# Patient Record
Sex: FEMALE | Race: AMERICAN INDIAN OR ALASKA NATIVE
[De-identification: names, ages, dates, MRNs, and addresses within clinical notes are randomized per-mention and may not be internally consistent; named-entity substitution may affect disease eponyms.]

---

## 2018-07-11 NOTE — EDM.PDOC
ED HPI GENERAL MEDICAL PROBLEM





- General


Chief Complaint: General


Stated Complaint: [id number] LUNG PAIN


Time Seen by Provider: 07/11/18 22:30


Source of Information: Reports: Patient


History Limitations: Reports: No Limitations





- History of Present Illness


INITIAL COMMENTS - FREE TEXT/NARRATIVE: 





c/o of right upper chest sharp pain with breathing in, noticed first this am 

while stretching and yawning. No recent cold or cough. No hx PE. No shortness 

of breath , no fever, no injury. Pain does not radiate. Has not tried anything 

to alleviate discomfort


  ** Right Upper Chest


Pain Score (Numeric/FACES): 5





- Related Data


 Allergies











Allergy/AdvReac Type Severity Reaction Status Date / Time


 


amoxicillin Allergy  Rash Verified 07/11/18 22:33











Home Meds: 


 Home Meds





. [No Known Home Meds]  07/11/18 [History]











Past Medical History


HEENT History: Reports: Impaired Vision


Other HEENT History: wears glasses


Cardiovascular History: Reports: None


Respiratory History: Reports: None


Gastrointestinal History: Reports: None


Genitourinary History: Reports: None


OB/GYN History: Reports: None


Musculoskeletal History: Reports: None


Neurological History: Reports: None


Psychiatric History: Reports: None


Endocrine/Metabolic History: Reports: None


Hematologic History: Reports: None


Immunologic History: Reports: None


Oncologic (Cancer) History: Reports: None


Dermatologic History: Reports: None





- Infectious Disease History


Infectious Disease History: Reports: Chicken Pox





- Past Surgical History


Head Surgeries/Procedures: Reports: None





Social & Family History





- Tobacco Use


Smoking Status *Q: Never Smoker


Second Hand Smoke Exposure: Yes





- Caffeine Use


Caffeine Use: Reports: Coffee, Soda





- Recreational Drug Use


Recreational Drug Use: No





ED ROS GENERAL





- Review of Systems


Review Of Systems: ROS reveals no pertinent complaints other than HPI.





ED EXAM, GENERAL





- Physical Exam


Exam: See Below


Exam Limited By: No Limitations


General Appearance: Alert, No Apparent Distress


Eye Exam: Bilateral Eye: EOMI


Ears: Normal External Exam


Nose: Normal Inspection


Throat/Mouth: Normal Inspection


Head: Atraumatic, Normocephalic


Neck: Normal Inspection, Full Range of Motion


Respiratory/Chest: No Respiratory Distress, Lungs Clear, Normal Breath Sounds


Cardiovascular: Normal Peripheral Pulses, Regular Rate, Rhythm


GI/Abdominal: Normal Bowel Sounds, Soft


Back Exam: Normal Inspection


Extremities: Normal Inspection, Normal Range of Motion


Neurological: Alert, Oriented, Normal Cognition


Psychiatric: Normal Affect


Skin Exam: Warm, Dry, Intact, Normal Color





Course





- Vital Signs


Last Recorded V/S: 


 Last Vital Signs











Temp  97.4 F   07/11/18 22:22


 


Pulse  61   07/11/18 22:22


 


Resp  18   07/11/18 22:22


 


BP  139/78   07/11/18 22:22


 


Pulse Ox  99   07/11/18 22:22














- Orders/Labs/Meds


Orders: 


 Active Orders 24 hr











 Category Date Time Status


 


 CXR [Chest 2V] [CR] Urgent Exams  07/11/18 22:38 Taken











Labs: 


 Laboratory Tests











  07/11/18 07/11/18 07/11/18 Range/Units





  22:48 22:48 22:48 


 


WBC  9.3    (5.0-10.0)  10^3/uL


 


RBC  4.46    (4.2-5.4)  10^6/uL


 


Hgb  11.4 L    (12.0-16.0)  g/dL


 


Hct  36.1 L    (37.0-47.0)  %


 


MCV  80.9    ()  fL


 


MCH  25.6 L    (27.0-34.0)  pg


 


MCHC  31.6 L    (33.0-35.0)  g/dL


 


Plt Count  340    (150-450)  10^3/uL


 


Neut % (Auto)  54.8    (42.2-75.2)  %


 


Lymph % (Auto)  32.8    (20.5-50.1)  %


 


Mono % (Auto)  6.3    (2-8)  %


 


Eos % (Auto)  5.8 H    (1.0-3.0)  %


 


Baso % (Auto)  0.3    (0.0-1.0)  %


 


D-Dimer, Quantitative   285   (0-400)  ng/mL


 


Sodium    138  (135-145)  mmol/L


 


Potassium    3.8  (3.6-5.0)  mmol/L


 


Chloride    107  (101-111)  mmol/L


 


Carbon Dioxide    26.0  (21.0-31.0)  mmol/L


 


Anion Gap    8.8  


 


BUN    14  (7-18)  mg/dL


 


Creatinine    0.7  (0.6-1.3)  mg/dL


 


Est Cr Clr Drug Dosing    121.78  mL/min


 


Estimated GFR (MDRD)    > 60  


 


BUN/Creatinine Ratio    20.00  


 


Glucose    131 H  ()  mg/dL


 


Calcium    9.0  (8.4-10.2)  mg/dl


 


Total Bilirubin    0.3  (0.2-1.0)  mg/dL


 


AST    27  (10-42)  IU/L


 


ALT    23  (10-60)  IU/L


 


Alkaline Phosphatase    68  ()  IU/L


 


Total Protein    8.6 H  (6.7-8.2)  g/dl


 


Albumin    4.0  (3.2-5.5)  g/dl


 


Globulin    4.6  


 


Albumin/Globulin Ratio    0.87  














- Radiology Interpretation


Free Text/Narrative:: 





CXR: Poor inspiratory effort, bibasilar subsegmental atelectasis





Departure





- Departure


Time of Disposition: 23:39


Disposition: Home, Self-Care 01


Condition: Good


Clinical Impression: 


 Right-sided chest wall pain








- Discharge Information


Instructions:  Nonspecific Chest Pain, Easy-to-Read


Referrals: 


PCP,None [Primary Care Provider] - 


Forms:  ED Department Discharge


Additional Instructions: 


ibuprofen 600mg one every 6 hours as needed


deep breathing every hour


follow up if symptoms worsen





- My Orders


Last 24 Hours: 


My Active Orders





07/11/18 22:38


CXR [Chest 2V] [CR] Urgent 














- Assessment/Plan


Last 24 Hours: 


My Active Orders





07/11/18 22:38


CXR [Chest 2V] [CR] Urgent

## 2020-01-06 NOTE — EDM.PDOC
ED HPI GENERAL MEDICAL PROBLEM





- General


Chief Complaint: Chest Pain


Stated Complaint: CHEST PAIN





- History of Present Illness


Treatments PTA: Reports: Other (see below)


Other Treatments PTA: none


  ** Left Anterior Chest


Pain Score (Numeric/FACES): 0





- Related Data


 Allergies











Allergy/AdvReac Type Severity Reaction Status Date / Time


 


amoxicillin Allergy  Rash Verified 01/05/20 23:49











Home Meds: 


 Home Meds





. [No Known Home Meds]  01/05/20 [History]











Past Medical History


HEENT History: Reports: Impaired Vision


Other HEENT History: wears glasses


Cardiovascular History: Reports: None


Respiratory History: Reports: None


Gastrointestinal History: Reports: None


Genitourinary History: Reports: None


OB/GYN History: Reports: None


Musculoskeletal History: Reports: Other (See Below)


Other Musculoskeletal History: hx rt arm fx and rt foot fx


Neurological History: Reports: None


Psychiatric History: Reports: None


Endocrine/Metabolic History: Reports: None


Hematologic History: Reports: None


Immunologic History: Reports: None


Oncologic (Cancer) History: Reports: None


Dermatologic History: Reports: None





- Infectious Disease History


Infectious Disease History: Reports: Chicken Pox





- Past Surgical History


Head Surgeries/Procedures: Reports: None





Social & Family History





- Family History


Family Medical History: Noncontributory





- Caffeine Use


Caffeine Use: Reports: Coffee





Course





- Vital Signs


Last Recorded V/S: 


 Last Vital Signs











Temp  98.8 F   01/05/20 23:44


 


Pulse  95   01/05/20 23:44


 


Resp  18   01/05/20 23:44


 


BP  133/83   01/05/20 23:44


 


Pulse Ox  98   01/05/20 23:44














Departure





- Departure


Disposition: Left Without Being Seen 07


Forms:  ED Department Discharge





Sepsis Event Note





- Evaluation


Sepsis Screening Result: No Definite Risk





- Focused Exam


Vital Signs: 


 Vital Signs











  Temp Pulse Resp BP Pulse Ox


 


 01/05/20 23:44  98.8 F  95  18  133/83  98











Date Exam was Performed: 01/06/20


Time Exam was Performed: 06:02

## 2021-09-19 NOTE — EDM.PDOC
ED HPI GENERAL MEDICAL PROBLEM





- General


Chief Complaint: Upper Extremity Injury/Pain


Stated Complaint: INJURED HAND


Time Seen by Provider: 09/19/21 22:40


Source of Information: Reports: Patient





- History of Present Illness


INITIAL COMMENTS - FREE TEXT/NARRATIVE: 


Pt is here for a right hand injury. She got upset at someone and punched a wall 

around 0800 this morning. She still has feeling in her hand and fingers, but has

been unable to bend her fingers since the incident. She denies any previous 

injury to the hand, but did break her wrist a long time ago. She denies any 

other injury. 





  ** Right Hand


Pain Score (Numeric/FACES): 7





- Related Data


                                    Allergies











Allergy/AdvReac Type Severity Reaction Status Date / Time


 


amoxicillin Allergy  Rash Verified 09/19/21 22:46











Home Meds: 


                                    Home Meds





. [No Known Home Meds]  01/05/20 [History]











Past Medical History


HEENT History: Reports: Impaired Vision


Other HEENT History: wears glasses


Cardiovascular History: Reports: None


Respiratory History: Reports: None


Gastrointestinal History: Reports: Cholelithiasis


Genitourinary History: Reports: None


OB/GYN History: Reports: None


Musculoskeletal History: Reports: Other (See Below)


Other Musculoskeletal History: hx rt arm fx and rt foot fx


Neurological History: Reports: None


Psychiatric History: Reports: None


Endocrine/Metabolic History: Reports: None


Hematologic History: Reports: Anemia


Immunologic History: Reports: None


Oncologic (Cancer) History: Reports: None


Dermatologic History: Reports: None





- Infectious Disease History


Infectious Disease History: Reports: Chicken Pox





- Past Surgical History


Head Surgeries/Procedures: Reports: None


GI Surgical History: Reports: Cholecystectomy





Social & Family History





- Family History


Family Medical History: No Pertinent Family History





- Caffeine Use


Caffeine Use: Reports: Coffee, Soda





Review of Systems





- Review of Systems


Review Of Systems: Comprehensive ROS is negative, except as noted in HPI.





ED EXAM, GENERAL





- Physical Exam


Exam: See Below


Exam Limited By: No Limitations


General Appearance: Alert, WD/WN, No Apparent Distress


Eye Exam: Bilateral Eye: Normal Inspection


Ears: Normal External Exam


Nose: Normal Inspection


Throat/Mouth: Normal Voice, No Airway Compromise


Head: Atraumatic, Normocephalic


Neck: Supple, Non-Tender


Respiratory/Chest: No Respiratory Distress, Lungs Clear, Normal Breath Sounds, 

No Accessory Muscle Use


Cardiovascular: Normal Peripheral Pulses, Regular Rate, Rhythm, No Murmur


GI/Abdominal: Soft, Non-Tender


 (Female) Exam: Deferred


Rectal (Female) Exam: Deferred


Back Exam: Normal Inspection, Full Range of Motion


Extremities: Normal Capillary Refill, Other (right hand swelling on ulnar side 

with deformity noted. Neurovascularly intact in distal hand and fingers. )


Neurological: Alert, Oriented, Normal Cognition, No Motor/Sensory Deficits


Psychiatric: Normal Affect, Normal Mood


Skin Exam: Warm, Dry, Intact, Normal Color





ED TRAUMA EXTREMITY PROCEDURES





- Splinting


  ** Right Upper Extremity


Splint Site: right hand/forearm


Pre-Procedure NV Status: Normal


Post-Procedure NV Status: Normal


Splint Material: Fiberglass


Splint Design: Gutter (ulnar)


Applied & Form Fitted By: Provider


Provider Post-Splint Application NV Check: NV Status Normal, Good Position


Complications: No





Course





- Vital Signs


Last Recorded V/S: 


                                Last Vital Signs











Temp  97.9 F   09/19/21 22:35


 


Pulse  100   09/19/21 22:35


 


Resp  16   09/19/21 22:35


 


BP  139/98 H  09/19/21 22:35


 


Pulse Ox  96   09/19/21 22:35














- Re-Assessments/Exams


Free Text/Narrative Re-Assessment/Exam: 


Boxers fracture of right 5th metacarpal reviewed with pt. Ulnar splint made. 


09/19/21 23:03











Departure





- Departure


Time of Disposition: 23:17


Disposition: Home, Self-Care 01


Condition: Good


Clinical Impression: 


Fracture of metacarpal bone


Qualifiers:


 Encounter type: initial encounter Metacarpal bone: fifth Fracture type: closed 

Metacarpal location: shaft Fracture alignment: displaced Laterality: right 

Qualified Code(s): S62.326A - Displaced fracture of shaft of fifth metacarpal 

bone, right hand, initial encounter for closed fracture








- Discharge Information


*PRESCRIPTION DRUG MONITORING PROGRAM REVIEWED*: Not Applicable


*COPY OF PRESCRIPTION DRUG MONITORING REPORT IN PATIENT BERNIE: Not Applicable


Instructions:  Cast or Splint Care, Adult, Easy-to-Read, Metacarpal Fracture, 

Easy-to-Read


Forms:  ED Department Discharge


Additional Instructions: 


Call your primary care provider tomorrow and schedule a follow up visit. 





Sepsis Event Note (ED)





- Focused Exam


Vital Signs: 


                                   Vital Signs











  Temp Pulse Resp BP Pulse Ox


 


 09/19/21 22:35  97.9 F  100  16  139/98 H  96

## 2021-09-19 NOTE — CR
PROCEDURE INFORMATION: 

Exam: XR Right Hand 

Exam date and time: 9/19/2021 10:54 PM 

Age: 30 years old 

Clinical indication: Other: Pain; Additional info: Hand injury, punched a wall 



TECHNIQUE: 

Imaging protocol: XR Right hand. 

Views: 3 or more views. 



COMPARISON: 

No relevant prior studies available. 



FINDINGS: 

Bones/joints: There is a transverse fracture of the distal shaft of the 5th 

metacarpal . Mild apex dorsal angulation. No additional fracture seen. Ulnar 

minus deformity. 

Soft tissues: Soft tissue swelling on the dorsum. 



IMPRESSION: 

Distal 5th metacarpal fracture.

## 2021-09-24 NOTE — CR
PROCEDURE INFORMATION: 

Exam: XR Right Tibia and Fibula 

Exam date and time: 9/24/2021 6:36 PM 

Age: 30 years old 

Clinical indication: Other: Marker points to area of laceration; Additional 

info: Chain saw to lower leg 



TECHNIQUE: 

Imaging protocol: XR Right tibia and fibula. 

Views: 2 views. 



COMPARISON: 

No relevant prior studies available. 



FINDINGS: 

Bones/joints: No fracture. No malalignment. 

Soft tissues: Anterior soft tissue trauma noted. No radiopaque foreign bodies. 



IMPRESSION: 

No acute osseous abnormality

## 2021-09-24 NOTE — EDM.PDOC
<Yancy Klein - Last Filed: 09/24/21 19:05>





ED HPI GENERAL MEDICAL PROBLEM





- General


Chief Complaint: Lower Extremity Injury/Pain


Stated Complaint: AMBULANCE


Time Seen by Provider: 09/24/21 18:30


Source of Information: Reports: Patient


History Limitations: Reports: No Limitations





- History of Present Illness


INITIAL COMMENTS - FREE TEXT/NARRATIVE: 


Patient is a 30-year-old female who presents to ER with [facility] 

[facility] with complaint of laceration to the right lower leg.  Patient states she

was cutting wood for a ceremony with a chain saw when the log slipped causing 

the chain saw to press against her leg.  Patient states she is not up-to-date on

her tetanus vaccination.  Denies any other injuries.  EMS did report patient 

glucose was in the 350 range and patient has no history of diabetes.


Onset: Today, Sudden





- Related Data


                                    Allergies











Allergy/AdvReac Type Severity Reaction Status Date / Time


 


amoxicillin Allergy  Rash Verified 09/24/21 18:28











Home Meds: 


                                    Home Meds





Acetaminophen [Pain Relief] 1,000 mg PO Q6HR PRN 09/24/21 [History]


Ibuprofen 600 mg PO Q6HR PRN 09/24/21 [History]











Past Medical History


HEENT History: Reports: Impaired Vision


Other HEENT History: wears glasses


Cardiovascular History: Reports: None


Respiratory History: Reports: None


Gastrointestinal History: Reports: Cholelithiasis


Genitourinary History: Reports: None


OB/GYN History: Reports: None


Musculoskeletal History: Reports: Other (See Below)


Other Musculoskeletal History: hx rt arm fx and rt foot fx


Neurological History: Reports: None


Psychiatric History: Reports: None


Endocrine/Metabolic History: Reports: None


Hematologic History: Reports: Anemia


Immunologic History: Reports: None


Oncologic (Cancer) History: Reports: None


Dermatologic History: Reports: None





- Infectious Disease History


Infectious Disease History: Reports: Chicken Pox





- Past Surgical History


Head Surgeries/Procedures: Reports: None


HEENT Surgical History: Reports: Oral Surgery


GI Surgical History: Reports: Cholecystectomy





Social & Family History





- Family History


Family Medical History: No Pertinent Family History





- Tobacco Use


Tobacco Use Status *Q: Never Tobacco User





- Caffeine Use


Caffeine Use: Reports: Coffee, Energy Drinks





- Recreational Drug Use


Recreational Drug Use: No





Review of Systems





- Review of Systems


Review Of Systems: Comprehensive ROS is negative, except as noted in HPI.





ED EXAM, GENERAL





- Physical Exam


Exam: See Below


Exam Limited By: No Limitations


General Appearance: Alert, WD/WN, Mild Distress


Eye Exam: Bilateral Eye: EOMI, Normal Inspection


Ears: Normal External Exam, Hearing Grossly Normal


Nose: Normal Inspection


Throat/Mouth: Normal Inspection, Normal Voice, No Airway Compromise


Head: Atraumatic, Normocephalic


Neck: Normal Inspection, Supple, Non-Tender, Full Range of Motion


Respiratory/Chest: No Respiratory Distress, Lungs Clear, Normal Breath Sounds, 

No Accessory Muscle Use, Chest Non-Tender


Cardiovascular: Normal Peripheral Pulses, Regular Rate, Rhythm, No Edema, No 

Gallop, No JVD, No Murmur, No Rub


Peripheral Pulses: 2+: Radial (L), Radial (R)


GI/Abdominal: Normal Bowel Sounds, Soft, Non-Tender


 (Female) Exam: Deferred


Rectal (Female) Exam: Deferred


Back Exam: Normal Inspection, Full Range of Motion, NT


Extremities: Normal Inspection, Normal Range of Motion, Non-Tender, Normal 

Capillary Refill, No Pedal Edema


Neurological: Alert, Oriented, CN II-XII Intact, Normal Cognition, Normal Gait, 

Normal Reflexes, No Motor/Sensory Deficits


Psychiatric: Normal Affect, Normal Mood


Skin Exam: Warm, Dry, Other (5cm laceration to anterior right shin)


Lymphatic: No Adenopathy





Course





- Re-Assessments/Exams


Free Text/Narrative Re-Assessment/Exam: 





09/24/21 19:05


Care turned over to SOLA Peoples at shift change. 





Departure





- Departure


Disposition: Home, Self-Care 01


Clinical Impression: 


 Contact with chainsaw as cause of accidental injury, Hyperglycemia





Laceration of right lower extremity


Qualifiers:


 Encounter type: initial encounter Qualified Code(s): S81.811A - Laceration 

without foreign body, right lower leg, initial encounter








- Discharge Information


Instructions:  Laceration Care, Adult


Forms:  ED Department Discharge


Additional Instructions: 


1.) Follow up with any primary care facility for suture removal in seven days. 


2.) You may take ibuprofen (Motrin/Advil) 400-800mg every six hours, as pain and

 swelling persist.  You may also take acetaminophen (Tylenol) 650-1000mg every 

six hours, as pain persists. You may stagger these medications so you are taking

 a dose every three hours. 


3.) Keep the wound clean and dry; You may cover it with a large bandage as 

drainage continues. 


4.) Follow up with your primary care provider, or return to the emergency 

department, with any increase in pain, redness, swelling, or grey/white 

drainage. 


5.) Follow up with your primary care provider in 7 days for repeat lab work, 

including glucose recheck. 


 





<Kimberli Wheeler - Last Filed: 09/25/21 19:36>





ED TRAUMA EXTREMITY PROCEDURES





- Laceration/Wound Repair


  ** Right Middle Anterior Midline Distal Leg


Lac/Wound Length In cm: 3.5


Appearance: Irregular, Mildly Contaminated


Distal NVT: Neuro & Vascular Intact, No Tendon Injury


Anesthetic Type: Local


Local Anesthesia - Lidocaine (Xylocaine): 1% Plain


Local Anesthetic Volume: Other (10)


Skin Prep: Chlorhexidine (Hibiciens), Saline, Sterile Drape


Saline Irrigation (cc's): 80


Exploration/Debridement/Repair: Wound Explored, In a Bloodless Field, Explored 

to Base, Moderate Debridement, Foreign Material Removed, Wound Margins Revised, 

Multiple Flaps Aligned


Closed With: Sutures


Suture Size: 4-0


# of Sutures: 8


Suture Type: Prolene


Drain Placement: No


Sterile Dressing Applied: Nurse


Tetanus Status Addressed: Yes


Complications: No





Course





- Vital Signs


Last Recorded V/S: 


                                Last Vital Signs











Temp  98.8 F   09/24/21 18:30


 


Pulse  86   09/24/21 18:30


 


Resp  18   09/24/21 18:30


 


BP  137/66   09/24/21 18:30


 


Pulse Ox  97   09/24/21 18:30














- Orders/Labs/Meds


Labs: 


                                Laboratory Tests











  09/24/21 09/24/21 Range/Units





  18:44 18:44 


 


WBC  8.9   (5.0-10.0)  10^3/uL


 


RBC  4.36   (4.2-5.4)  10^6/uL


 


Hgb  11.2 L   (12.0-16.0)  g/dL


 


Hct  34.6 L   (37.0-47.0)  %


 


MCV  79.4 L   ()  fL


 


MCH  25.7 L   (27.0-34.0)  pg


 


MCHC  32.4 L   (33.0-35.0)  g/dL


 


Plt Count  291  D   (150-450)  10^3/uL


 


Neut % (Auto)  59.2   (42.2-75.2)  %


 


Lymph % (Auto)  30.5   (20.5-50.1)  %


 


Mono % (Auto)  6.0   (2-8)  %


 


Eos % (Auto)  4.0 H   (1.0-3.0)  %


 


Baso % (Auto)  0.3   (0.0-1.0)  %


 


Sodium   131 L  (136-145)  mmol/L


 


Potassium   3.9  (3.5-5.1)  mmol/L


 


Chloride   98  ()  mmol/L


 


Carbon Dioxide   25  (21-32)  mmol/L


 


Anion Gap   11.9  (7-13)  mEq/L


 


BUN   14  (7-18)  mg/dL


 


Creatinine   0.97  (0.55-1.02)  mg/dL


 


Est Cr Clr Drug Dosing   84.01  mL/min


 


Estimated GFR (MDRD)   > 60  


 


BUN/Creatinine Ratio   14.4  (No establ ref range)  


 


Glucose   392 H  (70-99)  mg/dL


 


Calcium   8.6  (8.5-10.1)  mg/dL


 


Total Bilirubin   0.4  (0.2-1.0)  mg/dL


 


AST   20  (15-37)  U/L


 


ALT   31  (14-59)  U/L


 


Alkaline Phosphatase   84  ()  U/L


 


Total Protein   7.7  (6.4-8.2)  g/dL


 


Albumin   3.2 L  (3.4-5.0)  g/dL


 


Globulin   4.5  


 


Albumin/Globulin Ratio   0.71  











Meds: 


Medications














Discontinued Medications














Generic Name Dose Route Start Last Admin





  Trade Name Freq  PRN Reason Stop Dose Admin


 


Bacitracin  1 dose  09/24/21 18:46  09/24/21 18:54





  Bacitracin Oint 1 Gm U/D Packet  TOP  09/24/21 18:47  1 dose





  ONETIME ONE   Administration


 


Diphtheria/Tetanus/Acell Pertussis  0.5 ml  09/24/21 18:25  09/24/21 18:31





  Diphtheria,Pertussis(Acell),Tetanus Vaccine 0.5 Ml Syringe  IM  09/24/21 18:26

  0.5 ml





  .ONCE ONE   Administration


 


Lidocaine HCl  30 ml  09/24/21 18:46  09/24/21 18:54





  Lidocaine 1% 30 Ml Sdv  INJECT  09/24/21 18:47  30 ml





  ONETIME ONE   Administration














- Re-Assessments/Exams


Free Text/Narrative Re-Assessment/Exam: 





09/24/21


Care of patient assumed by writer from SOLA Oconnell at 1900. 





Moderate debridement of wound performed prior to laceration repair.  Laceration 

sutured without complication.  Supportive cares, as well as red flag signs and 

symptoms which would warrant reevaluation discussed.  Patient instructed on 

follow up for suture removal in 7 days.  Patient verbalized understanding and 

agreement with the plan of care. 








Departure





- Departure


Time of Disposition: 19:55


Condition: Good





- Discharge Information


*PRESCRIPTION DRUG MONITORING PROGRAM REVIEWED*: Not Applicable


*COPY OF PRESCRIPTION DRUG MONITORING REPORT IN PATIENT BERNIE: Not Applicable

## 2023-01-30 ENCOUNTER — HOSPITAL ENCOUNTER (EMERGENCY)
Dept: HOSPITAL 43 - DL.ED | Age: 33
LOS: 1 days | Discharge: HOME | End: 2023-01-31
Payer: MEDICAID

## 2023-01-30 DIAGNOSIS — M79.601: Primary | ICD-10-CM

## 2023-01-30 DIAGNOSIS — M79.602: ICD-10-CM

## 2023-01-30 DIAGNOSIS — M79.673: ICD-10-CM

## 2023-01-30 DIAGNOSIS — R73.9: ICD-10-CM

## 2023-01-30 DIAGNOSIS — R07.0: ICD-10-CM

## 2023-01-30 DIAGNOSIS — Z20.822: ICD-10-CM

## 2023-01-30 DIAGNOSIS — Z88.0: ICD-10-CM

## 2023-01-30 PROCEDURE — 84550 ASSAY OF BLOOD/URIC ACID: CPT

## 2023-01-30 PROCEDURE — 96360 HYDRATION IV INFUSION INIT: CPT

## 2023-01-30 PROCEDURE — 80305 DRUG TEST PRSMV DIR OPT OBS: CPT

## 2023-01-30 PROCEDURE — 81025 URINE PREGNANCY TEST: CPT

## 2023-01-30 PROCEDURE — 83605 ASSAY OF LACTIC ACID: CPT

## 2023-01-30 PROCEDURE — 81003 URINALYSIS AUTO W/O SCOPE: CPT

## 2023-01-30 PROCEDURE — 99283 EMERGENCY DEPT VISIT LOW MDM: CPT

## 2023-01-30 PROCEDURE — 36415 COLL VENOUS BLD VENIPUNCTURE: CPT

## 2023-01-30 PROCEDURE — 87040 BLOOD CULTURE FOR BACTERIA: CPT

## 2023-01-30 PROCEDURE — 85025 COMPLETE CBC W/AUTO DIFF WBC: CPT

## 2023-01-30 PROCEDURE — 0240U: CPT

## 2023-01-30 PROCEDURE — 80053 COMPREHEN METABOLIC PANEL: CPT

## 2023-01-31 VITALS — SYSTOLIC BLOOD PRESSURE: 139 MMHG | DIASTOLIC BLOOD PRESSURE: 87 MMHG | HEART RATE: 97 BPM

## 2023-01-31 LAB
AMPHET UR QL SCN: NEGATIVE
AMPHET UR QL SCN: NEGATIVE
AMPHETAMINES UR QL SCN>500 NG/ML: NEGATIVE
ANION GAP SERPL CALC-SCNC: 9.7 MEQ/L (ref 7–13)
BARBITURATES UR QL SCN: NEGATIVE
CHLORIDE SERPL-SCNC: 101 MMOL/L (ref 98–107)
EGFRCR SERPLBLD CKD-EPI 2021: 68 ML/MIN (ref 60–?)
MDMA UR QL SCN: NEGATIVE
OXYCODONE UR QL SCN: NEGATIVE
PCP UR QL SCN: NEGATIVE
SARS-COV-2 RNA RESP QL NAA+PROBE: NEGATIVE
SODIUM SERPL-SCNC: 135 MMOL/L (ref 136–145)
TRICYCLICS UR QL SCN: NEGATIVE

## 2023-10-07 ENCOUNTER — HOSPITAL ENCOUNTER (EMERGENCY)
Dept: HOSPITAL 43 - DL.ED | Age: 33
Discharge: HOME | End: 2023-10-07
Payer: MEDICAID

## 2023-10-07 VITALS — HEART RATE: 93 BPM | DIASTOLIC BLOOD PRESSURE: 81 MMHG | SYSTOLIC BLOOD PRESSURE: 160 MMHG

## 2023-10-07 DIAGNOSIS — M46.1: Primary | ICD-10-CM

## 2023-10-07 DIAGNOSIS — Z79.899: ICD-10-CM

## 2023-10-07 DIAGNOSIS — Z79.82: ICD-10-CM

## 2023-10-07 DIAGNOSIS — E11.9: ICD-10-CM

## 2023-10-07 DIAGNOSIS — Z88.1: ICD-10-CM
